# Patient Record
Sex: MALE | HISPANIC OR LATINO | Employment: PART TIME | URBAN - METROPOLITAN AREA
[De-identification: names, ages, dates, MRNs, and addresses within clinical notes are randomized per-mention and may not be internally consistent; named-entity substitution may affect disease eponyms.]

---

## 2023-09-13 ENCOUNTER — NURSE TRIAGE (OUTPATIENT)
Dept: NURSING | Facility: CLINIC | Age: 43
End: 2023-09-13

## 2023-09-14 ENCOUNTER — NURSE TRIAGE (OUTPATIENT)
Dept: NURSING | Facility: CLINIC | Age: 43
End: 2023-09-14

## 2023-09-14 NOTE — TELEPHONE ENCOUNTER
Nurse Triage SBAR    Is this a 2nd Level Triage? YES, LICENSED PRACTITIONER REVIEW IS REQUIRED    Situation: Alphonse with girlfriend calling concerned right leg has red swollen lump    Background: Has given himself an injection of testosterone 3 days ago.    Assessment: Temp 101.1 mild pain. No drainage.    Protocol Recommended Disposition:   See HCP Within 4 Hours (Or PCP Triage)    Recommendation: Go to the ED     Pt refused because he has no insurance. Advised to go in anyway they can't refused treatment.  Also provided number to [facility].  Melony Kennedy RN on 9/13/2023 at 8:17 PM    Does the patient meet one of the following criteria for ADS visit consideration? No    Reason for Disposition   [1] Swelling is red AND [2] fever    Additional Information   Negative: Sounds like a life-threatening emergency to the triager   Negative: SEVERE pain (e.g., excruciating)   Negative: [1] Swelling is painful to touch AND [2] fever    Protocols used: Skin Lump or Localized Swelling-A-AH

## 2023-09-14 NOTE — TELEPHONE ENCOUNTER
Patient had a testosterone injection into his right thigh on Saturday night. Symptoms started yesterday. Today there is redness, swelling, and pain at the injection site. The redness is about 3-4 inches across. The area of the injection feels warm to the touch.   Patient reports that he showered before and scrubbed the area with an alcohol swab before giving himself the injection.   Denies fever or severe pain.   Protocol recommends see in office now  Patient care advice given.  location and hours given. Patient agrees to present to [facility] in clinic.   Queenie Hendrickson RN   09/14/23 11:14 AM  [facility] Nurse Advisor    Reason for Disposition   Looks like a boil, infected sore, deep ulcer, or other infected rash (spreading redness, pus)    Additional Information   Negative: Sounds like a life-threatening emergency to the triager   Negative: Athlete's Foot suspected (i.e., itchy rash between the toes)   Negative: Insect bite(s) suspected   Negative: Jock Itch suspected (i.e., itchy rash on inner thighs near genital area)   Negative: Localized lump (or swelling) without redness or rash   Negative: MPOX SUSPECTED (e.g., direct skin contact such as sex, recent travel to [place] or [place]) and any SYMPTOMS OF MPOX (e.g., rash, fever, muscle aches, or swollen lymph nodes)   Negative: At risk for Mpox (men-who-have-sex-with-men) and possible exposure (e.g., multiple sex partners in past 21 days) and ANY SYMPTOMS OF MPOX (e.g., rash, fever, muscle aches, or swollen lymph nodes)   Negative: Poison ivy, oak, or sumac rash suspected (e.g., itchy rash after contact with poison ivy)   Negative: Rash of female genital area (e.g., labia, vagina, vulva)   Negative: Rash of male genital area (e.g., penis, scrotum)   Negative: Redness of immunization site   Negative: Shingles suspected (i.e., painful rash, multiple small blisters grouped together in one area of body; dermatomal distribution)   Negative: Small spot,  skin growth, or mole   Negative: Wound infection suspected (i.e., pain, spreading redness, or pus; in a cut, puncture, scrape or sutured wound)   Negative: Fever and localized purple or blood-colored spots or dots that are not from injury or friction   Negative: Fever and localized rash is very painful   Negative: Patient sounds very sick or weak to the triager    Protocols used: Rash or Redness - [id number]     asymptomatic